# Patient Record
Sex: FEMALE | Race: WHITE | NOT HISPANIC OR LATINO | Employment: FULL TIME | URBAN - METROPOLITAN AREA
[De-identification: names, ages, dates, MRNs, and addresses within clinical notes are randomized per-mention and may not be internally consistent; named-entity substitution may affect disease eponyms.]

---

## 2023-03-29 ENCOUNTER — OFFICE VISIT (OUTPATIENT)
Dept: URGENT CARE | Facility: CLINIC | Age: 23
End: 2023-03-29
Payer: COMMERCIAL

## 2023-03-29 VITALS
DIASTOLIC BLOOD PRESSURE: 88 MMHG | WEIGHT: 230 LBS | RESPIRATION RATE: 20 BRPM | HEART RATE: 90 BPM | BODY MASS INDEX: 34.07 KG/M2 | HEIGHT: 69 IN | SYSTOLIC BLOOD PRESSURE: 127 MMHG | OXYGEN SATURATION: 97 % | TEMPERATURE: 98 F

## 2023-03-29 DIAGNOSIS — N30.01 ACUTE CYSTITIS WITH HEMATURIA: Primary | ICD-10-CM

## 2023-03-29 LAB
BILIRUB UR QL STRIP: NEGATIVE
GLUCOSE UR QL STRIP: NEGATIVE
KETONES UR QL STRIP: NEGATIVE
LEUKOCYTE ESTERASE UR QL STRIP: POSITIVE
PH, POC UA: 7
POC BLOOD, URINE: POSITIVE
POC NITRATES, URINE: NEGATIVE
PROT UR QL STRIP: POSITIVE
SP GR UR STRIP: 1.01 (ref 1–1.03)
UROBILINOGEN UR STRIP-ACNC: 2 (ref 0.1–1.1)

## 2023-03-29 PROCEDURE — 81003 URINALYSIS AUTO W/O SCOPE: CPT | Mod: QW,,, | Performed by: FAMILY MEDICINE

## 2023-03-29 PROCEDURE — 81003 POCT URINALYSIS, DIPSTICK, AUTOMATED, W/O SCOPE: ICD-10-PCS | Mod: QW,,, | Performed by: FAMILY MEDICINE

## 2023-03-29 PROCEDURE — 99213 OFFICE O/P EST LOW 20 MIN: CPT | Mod: S$PBB,,, | Performed by: FAMILY MEDICINE

## 2023-03-29 PROCEDURE — 87077 CULTURE AEROBIC IDENTIFY: CPT | Performed by: FAMILY MEDICINE

## 2023-03-29 PROCEDURE — 99213 PR OFFICE/OUTPT VISIT, EST, LEVL III, 20-29 MIN: ICD-10-PCS | Mod: S$PBB,,, | Performed by: FAMILY MEDICINE

## 2023-03-29 RX ORDER — NITROFURANTOIN 25; 75 MG/1; MG/1
100 CAPSULE ORAL 2 TIMES DAILY
Qty: 10 CAPSULE | Refills: 0 | Status: SHIPPED | OUTPATIENT
Start: 2023-03-29 | End: 2023-04-03

## 2023-03-29 RX ORDER — PHENAZOPYRIDINE HYDROCHLORIDE 200 MG/1
200 TABLET, FILM COATED ORAL 3 TIMES DAILY PRN
Qty: 6 TABLET | Refills: 0 | Status: SHIPPED | OUTPATIENT
Start: 2023-03-29 | End: 2023-03-31

## 2023-03-29 RX ORDER — ALPRAZOLAM 2 MG/1
2 TABLET ORAL ONCE
COMMUNITY
Start: 2023-02-22

## 2023-03-29 NOTE — PROGRESS NOTES
"        Patient ID: [id number]     Chief Complaint: Dysuria (Burning with urination, x 10 days, diarrhea last week, now constipated )      History of Present Illness:     Kamila Moore is a 22 y.o. female with a history of prior UTIs who presents today for symptoms of urinary frequency, urgency and dysuria. She denies experiencing any fevers, chills, nausea, vomiting, back pain/ flank pain, or significant fatigue or malaise beyond baseline. Her oral intake has been normal.     Past Medical History:     ----------------------------  No known health problems     Past Surgical History:   Procedure Laterality Date    NO PAST SURGERIES         Review of patient's allergies indicates:  No Known Allergies    No outpatient medications have been marked as taking for the 3/29/23 encounter (Office Visit) with Keven Chau MD.       Social History     Socioeconomic History    Marital status: Single   Tobacco Use    Smoking status: Every Day     Types: Cigarettes, Vaping with nicotine    Smokeless tobacco: Never   Substance and Sexual Activity    Alcohol use: Yes     Alcohol/week: 4.0 standard drinks     Types: 4 Cans of beer per week    Drug use: Never        Family History   Problem Relation Age of Onset    Mental illness Mother     Diabetes Mother     Hypertension Father     Asthma Sister     No Known Problems Brother         Subjective:     Review of Systems   Constitutional:  Negative for chills, fever and malaise/fatigue.   Gastrointestinal:  Positive for constipation and diarrhea. Negative for abdominal pain, nausea and vomiting.   Genitourinary:  Positive for dysuria, frequency and urgency.   Musculoskeletal:  Negative for back pain.     Objective:     /88   Pulse 90   Temp 98.1 °F (36.7 °C) (Oral)   Resp 20   Ht 5' 9" (1.753 m)   Wt 104.3 kg (230 lb)   LMP 03/16/2023 (Approximate)   SpO2 97%   BMI 33.97 kg/m²     Physical Exam  Constitutional:       General: She is not in acute distress.     " "Appearance: Normal appearance. She is normal weight. She is not ill-appearing or toxic-appearing.   Cardiovascular:      Rate and Rhythm: Normal rate.   Pulmonary:      Effort: Pulmonary effort is normal.   Abdominal:      Tenderness: There is abdominal tenderness. There is no right CVA tenderness or left CVA tenderness.   Skin:     Coloration: Skin is not pale.      Findings: No rash.   Neurological:      Mental Status: She is alert.       Assessment & Plan:       ICD-10-CM ICD-9-CM   1. Acute cystitis with hematuria  N30.01 595.0        1. Acute cystitis with hematuria  -     POCT Urinalysis, Dipstick, Automated, W/O Scope  -     nitrofurantoin, macrocrystal-monohydrate, (MACROBID) 100 MG capsule; Take 1 capsule (100 mg total) by mouth 2 (two) times daily. for 5 days  Dispense: 10 capsule; Refill: 0  -     phenazopyridine (PYRIDIUM) 200 MG tablet; Take 1 tablet (200 mg total) by mouth 3 (three) times daily as needed for Pain.  Dispense: 6 tablet; Refill: 0  -     Urine culture         POC urinalysis results were consistent with a UTI. She does not have symptoms indicating the infection has spread beyond the bladder (e.g., fever >99.9F, chills, flank pain, significant fatigue/malaise, CVA tenderness). She is not pregnant, is not a renal transplant recipient, is not otherwise immunocompromised, has no underlying urological abnormalities (e.g., nephrolithiasis, strictures, stents, or urinary diversions), and does not have poorly controlled DM. Her presentation is therefore consistent with acute simple cystitis.     Her prior 3 month history does not show that there are any risk factors for a Multi-Drug Resistant gram-negative infection: there is no MDR urinary isolate, no inpatient stay at a health care facility, no use of fluoroquinolones, TMP-SMX, or 3rd/4th gen cephalosporins, and she has not traveled to [place], [place], [place], or [place]. Further, this UTI does not constitute a  "Recurrent UTI" (i.e., ?2 infections " in six months or ?3 infections in one year). Absent MDR risk factors or recurrent UTIs, empiric antibiotic therapy can be administered without urine culture.     We discussed that guideline-directed empiric antibiotic treatment begins with Nitrofurantoin (if CrCl >30), TMP-SMX, or Fosfomycin, unless she has an allergy or intolerance to them. With no such allergy, we will start with Macrobid.     We discussed that, if she continues to have symptoms 48-72 hours after starting antibiotics, she should return for a urine culture if not already ordered, broadening of antibiotic coverage, and re-evaluation for alternative diagnoses.    Since hematuria was found on urinalysis today, she was advised to follow up with her primary provider 4 weeks after completing antibiotics for a repeat urinalysis to confirm resolution of the hematuria.

## 2023-03-29 NOTE — PATIENT INSTRUCTIONS
Please take Macrobid twice daily for 5 days.    We will call you with the results of your urine culture in a couple of days.    Because blood was found in your urine, we recommend you have a repeat urine dipstick or urinalysis done 4 weeks after your urinary symptoms resolve, to make sure that the blood is gone from your urine.    If you develop fever, chills, nausea, vomiting, flank pain, back pain, abdominal pain, or visible blood in the urine, please present to the emergency room or otherwise seek medical attention.

## 2023-04-01 LAB — BACTERIA UR CULT: ABNORMAL

## 2023-05-29 ENCOUNTER — OFFICE VISIT (OUTPATIENT)
Dept: URGENT CARE | Facility: CLINIC | Age: 23
End: 2023-05-29
Payer: COMMERCIAL

## 2023-05-29 VITALS
WEIGHT: 229.94 LBS | HEIGHT: 69 IN | OXYGEN SATURATION: 98 % | SYSTOLIC BLOOD PRESSURE: 117 MMHG | TEMPERATURE: 99 F | DIASTOLIC BLOOD PRESSURE: 85 MMHG | RESPIRATION RATE: 18 BRPM | BODY MASS INDEX: 34.06 KG/M2 | HEART RATE: 91 BPM

## 2023-05-29 DIAGNOSIS — J02.9 SORE THROAT: Primary | ICD-10-CM

## 2023-05-29 DIAGNOSIS — R52 BODY ACHES: ICD-10-CM

## 2023-05-29 DIAGNOSIS — J02.9 PHARYNGITIS, UNSPECIFIED ETIOLOGY: ICD-10-CM

## 2023-05-29 LAB
CTP QC/QA: YES
MOLECULAR STREP A: NEGATIVE
POC MOLECULAR INFLUENZA A AGN: NEGATIVE
POC MOLECULAR INFLUENZA B AGN: NEGATIVE
SARS-COV-2 RDRP RESP QL NAA+PROBE: NEGATIVE

## 2023-05-29 PROCEDURE — 99204 OFFICE O/P NEW MOD 45 MIN: CPT | Mod: 25,,, | Performed by: NURSE PRACTITIONER

## 2023-05-29 PROCEDURE — 96372 THER/PROPH/DIAG INJ SC/IM: CPT | Mod: ,,, | Performed by: NURSE PRACTITIONER

## 2023-05-29 PROCEDURE — 99204 PR OFFICE/OUTPT VISIT, NEW, LEVL IV, 45-59 MIN: ICD-10-PCS | Mod: 25,,, | Performed by: NURSE PRACTITIONER

## 2023-05-29 PROCEDURE — 96372 PR INJECTION,THERAP/PROPH/DIAG2ST, IM OR SUBCUT: ICD-10-PCS | Mod: ,,, | Performed by: NURSE PRACTITIONER

## 2023-05-29 PROCEDURE — 87635: ICD-10-PCS | Mod: QW,,, | Performed by: NURSE PRACTITIONER

## 2023-05-29 PROCEDURE — 87502 INFLUENZA DNA AMP PROBE: CPT | Mod: QW,,, | Performed by: NURSE PRACTITIONER

## 2023-05-29 PROCEDURE — 87502 POCT INFLUENZA A/B MOLECULAR: ICD-10-PCS | Mod: QW,,, | Performed by: NURSE PRACTITIONER

## 2023-05-29 PROCEDURE — 87635 SARS-COV-2 COVID-19 AMP PRB: CPT | Mod: QW,,, | Performed by: NURSE PRACTITIONER

## 2023-05-29 PROCEDURE — 87651 STREP A DNA AMP PROBE: CPT | Mod: QW,,, | Performed by: NURSE PRACTITIONER

## 2023-05-29 PROCEDURE — 87651 POCT STREP A MOLECULAR: ICD-10-PCS | Mod: QW,,, | Performed by: NURSE PRACTITIONER

## 2023-05-29 RX ORDER — AZITHROMYCIN 250 MG/1
TABLET, FILM COATED ORAL
Qty: 6 TABLET | Refills: 0 | Status: SHIPPED | OUTPATIENT
Start: 2023-05-29

## 2023-05-29 RX ORDER — DEXAMETHASONE SODIUM PHOSPHATE 100 MG/10ML
10 INJECTION INTRAMUSCULAR; INTRAVENOUS ONCE
Status: COMPLETED | OUTPATIENT
Start: 2023-05-29 | End: 2023-05-29

## 2023-05-29 RX ADMIN — DEXAMETHASONE SODIUM PHOSPHATE 10 MG: 100 INJECTION INTRAMUSCULAR; INTRAVENOUS at 01:05

## 2023-05-29 NOTE — PATIENT INSTRUCTIONS
Increase oral fluids  Warm salt water gargles as instructed  OTC Chloraseptic spray as directed  Ibuprofen or Tylenol OTC for pain as directed  Take prescription medication as directed  Change toothbrush  Follow up PCP or return here for concerns

## 2023-05-29 NOTE — PROGRESS NOTES
"Subjective:      Patient ID: Kamila Moore is a 23 y.o. female.    Vitals:  height is 5' 9" (1.753 m) and weight is 104.3 kg (229 lb 15 oz). Her oral temperature is 98.9 °F (37.2 °C). Her blood pressure is 117/85 and her pulse is 91. Her respiration is 18 and oxygen saturation is 98%.     Chief Complaint: Sore Throat (Pt c/o sore throat onset Saturday with headaches onset Friday. )    23-year-old female presents with sinus congestion and worsening sore throat.  Onset 3 days ago.  No shortness of breath or fever    HENT:  Positive for congestion and sore throat.     Objective:     Physical Exam   Constitutional: She is oriented to person, place, and time. She appears well-developed. She is cooperative.  Non-toxic appearance. She does not appear ill. No distress.   HENT:   Head: Normocephalic and atraumatic.   Ears:   Right Ear: Hearing, tympanic membrane, external ear and ear canal normal.   Left Ear: Hearing, tympanic membrane, external ear and ear canal normal.   Nose: Nose normal. No mucosal edema, rhinorrhea or nasal deformity. No epistaxis. Right sinus exhibits no maxillary sinus tenderness and no frontal sinus tenderness. Left sinus exhibits no maxillary sinus tenderness and no frontal sinus tenderness.   Mouth/Throat: Uvula is midline and mucous membranes are normal. No trismus in the jaw. Normal dentition. No uvula swelling. Oropharyngeal exudate and posterior oropharyngeal erythema present. No posterior oropharyngeal edema.   Eyes: Conjunctivae and lids are normal. No scleral icterus.   Neck: Trachea normal and phonation normal. Neck supple. No edema present. No erythema present. No neck rigidity present.   Cardiovascular: Normal rate, regular rhythm, normal heart sounds and normal pulses.   Pulmonary/Chest: Effort normal and breath sounds normal. No respiratory distress. She has no decreased breath sounds. She has no rhonchi.   Abdominal: Normal appearance.   Musculoskeletal: Normal range of motion.   "       General: No deformity. Normal range of motion.   Neurological: She is alert and oriented to person, place, and time. She exhibits normal muscle tone. Coordination normal.   Skin: Skin is warm, dry, intact, not diaphoretic and not pale.   Psychiatric: Her speech is normal and behavior is normal. Judgment and thought content normal.   Nursing note and vitals reviewed.    Assessment:     1. Sore throat    2. Body aches    3. Pharyngitis, unspecified etiology      Office Visit on 05/29/2023   Component Date Value Ref Range Status    Molecular Strep A, POC 05/29/2023 Negative  Negative Final     Acceptable 05/29/2023 Yes   Final    POC Rapid COVID 05/29/2023 Negative  Negative Final     Acceptable 05/29/2023 Yes   Final    POC Molecular Influenza A Ag 05/29/2023 Negative  Negative, Not Reported Final    POC Molecular Influenza B Ag 05/29/2023 Negative  Negative, Not Reported Final     Acceptable 05/29/2023 Yes   Final      Plan:   Increase oral fluids  Warm salt water gargles as instructed  OTC Chloraseptic spray as directed  Ibuprofen or Tylenol OTC for pain as directed  Take prescription medication as directed  Change toothbrush  Follow up PCP or return here for concerns   Antibiotics will be prescribed despite a negative strep test due to clinical assessment and findings  Sore throat  -     POCT Strep A, Molecular    Body aches  -     POCT COVID-19 Rapid Screening  -     POCT Influenza A/B MOLECULAR    Pharyngitis, unspecified etiology

## 2023-07-17 ENCOUNTER — OFFICE VISIT (OUTPATIENT)
Dept: URGENT CARE | Facility: CLINIC | Age: 23
End: 2023-07-17
Payer: COMMERCIAL

## 2023-07-17 VITALS
RESPIRATION RATE: 18 BRPM | HEART RATE: 91 BPM | DIASTOLIC BLOOD PRESSURE: 90 MMHG | SYSTOLIC BLOOD PRESSURE: 124 MMHG | BODY MASS INDEX: 33.92 KG/M2 | OXYGEN SATURATION: 99 % | HEIGHT: 69 IN | WEIGHT: 229 LBS | TEMPERATURE: 98 F

## 2023-07-17 DIAGNOSIS — R00.2 PALPITATIONS WITH REGULAR CARDIAC RHYTHM: Primary | ICD-10-CM

## 2023-07-17 DIAGNOSIS — F41.9 ANXIETY: ICD-10-CM

## 2023-07-17 PROCEDURE — 99213 OFFICE O/P EST LOW 20 MIN: CPT | Mod: ,,, | Performed by: FAMILY MEDICINE

## 2023-07-17 PROCEDURE — 99213 PR OFFICE/OUTPT VISIT, EST, LEVL III, 20-29 MIN: ICD-10-PCS | Mod: ,,, | Performed by: FAMILY MEDICINE

## 2023-07-17 NOTE — LETTER
July 17, 2023      [facility]  [address], [address]  [place] [address]  Phone: [phone]  Fax: [phone]       Patient: Kamila Moore   YOB: 2000  Date of Visit: 07/17/2023    To Whom It May Concern:    Angela Moore  was at [facility] on 07/17/2023. The patient may return to work/school on 07/18/2023 with no restrictions. If you have any questions or concerns, or if I can be of further assistance, please do not hesitate to contact me.    Sincerely,    Keven Chau MD

## 2023-07-17 NOTE — PATIENT INSTRUCTIONS
Please hydrate with electrolyte rich fluids.    Please return if your heart palpitations continue after your diarrhea and abdominal pain fully resolve.    Please also return if you develop shortness of breath at rest, chest pain, dizziness, or began feeling an erratic heart rate.

## 2023-07-17 NOTE — PROGRESS NOTES
Patient ID: [id number]     Chief Complaint:  Abdominal pain    History of Present Illness:     Kamila Moore is a 23 y.o. female  who presents today for symptoms of Abdominal Pain (Patient presents to the clinic c/o LLQ Abdominal pain, nausea, diarrhea x 3 days. rapid heart rate (highest of 120bpm on Sunday). Patient reports chest tightness which started last night, but subsided. Denies vomit; OTC meds taken: tylenol )        23-year-old female with a history of anxiety presents today with a 3 day history of mild nausea, diarrhea, and recently a rapid heart rate detected on her watch, and palpitations which she can feel.  The rapid heart rate and palpitations are intermittent.  They woke her up last night a couple of times, but were gone when she woke up for good this morning.  She is never had panic attacks before, but she believes that she is concerned about her heart rate which might be making it worse.  She reports she might be slightly dehydrated because she has had so much diarrhea with decreased oral intake.  Her diarrhea is mostly gone, and her abdominal pain is gone this morning.  Her main concern coming in is she wanted to talk about her heart rate because it is worrisome to her.  No history of personal or family heart history.  She denies chest pain, shortness at breath, or dizziness, but she had some fleeting chest tightness last night that resolved on its own.    Pt denies experiencing any fevers, chills, vomiting, difficulty breathing, dysphagia, or neck stiffness.    Past Medical History:     ----------------------------  No known health problems     Past Surgical History:   Procedure Laterality Date    NO PAST SURGERIES         Review of patient's allergies indicates:  No Known Allergies    No outpatient medications have been marked as taking for the 7/17/23 encounter (Office Visit) with Keven Chau MD.       Social History     Socioeconomic History    Marital status: Single   Tobacco Use  "   Smoking status: Every Day     Types: Vaping with nicotine    Smokeless tobacco: Never   Substance and Sexual Activity    Alcohol use: Yes     Alcohol/week: 4.0 standard drinks     Types: 4 Cans of beer per week    Drug use: Never        Family History   Problem Relation Age of Onset    Mental illness Mother     Diabetes Mother     Hypertension Father     Asthma Sister     No Known Problems Brother         Subjective:     Review of Systems   Constitutional:  Negative for chills, fever and malaise/fatigue.   HENT:  Negative for congestion, ear discharge, ear pain, sinus pain and sore throat.    Respiratory:  Negative for cough, sputum production, shortness of breath, wheezing and stridor.    Gastrointestinal:  Positive for abdominal pain, diarrhea and nausea. Negative for blood in stool, constipation, heartburn, melena and vomiting.   Genitourinary:  Negative for dysuria, frequency and urgency.   Musculoskeletal:  Negative for neck pain.   Skin:  Negative for rash.   Neurological:  Negative for headaches.     Objective:     BP (!) 124/90   Pulse 91   Temp 98.2 °F (36.8 °C)   Resp 18   Ht 5' 9" (1.753 m)   Wt 103.9 kg (229 lb)   LMP 07/07/2023   SpO2 99%   BMI 33.82 kg/m²     Physical Exam  Constitutional:       General: She is not in acute distress.     Appearance: Normal appearance. She is not ill-appearing or toxic-appearing.   HENT:      Head: Normocephalic and atraumatic.      Right Ear: Tympanic membrane and ear canal normal.      Left Ear: Tympanic membrane and ear canal normal.      Nose: No congestion or rhinorrhea.      Mouth/Throat:      Pharynx: Oropharynx is clear. No oropharyngeal exudate or posterior oropharyngeal erythema.   Eyes:      General:         Right eye: No discharge.         Left eye: No discharge.      Extraocular Movements: Extraocular movements intact.      Conjunctiva/sclera: Conjunctivae normal.   Cardiovascular:      Rate and Rhythm: Normal rate and regular rhythm.      " Pulses: Normal pulses.      Heart sounds: Normal heart sounds. No murmur heard.    No gallop.      Comments: Heart rate 85 on exam.  Normal rhythm.  Bilateral radial pulses are strong and coincident.  No abnormal heart sounds  Pulmonary:      Effort: Pulmonary effort is normal. No respiratory distress.      Breath sounds: Normal breath sounds. No stridor. No wheezing, rhonchi or rales.   Chest:      Chest wall: No tenderness.   Abdominal:      General: There is no distension.      Palpations: There is no mass.      Tenderness: There is no abdominal tenderness. There is no right CVA tenderness, left CVA tenderness, guarding or rebound.      Hernia: No hernia is present.   Musculoskeletal:      Cervical back: No rigidity or tenderness.   Lymphadenopathy:      Cervical: No cervical adenopathy.   Neurological:      Mental Status: She is alert and oriented to person, place, and time. Mental status is at baseline.   Psychiatric:         Mood and Affect: Mood normal.         Behavior: Behavior normal.       Assessment & Plan:       ICD-10-CM ICD-9-CM   1. Palpitations with regular cardiac rhythm  R00.2 785.1   2. Anxiety  F41.9 300.00        1. Palpitations with regular cardiac rhythm    2. Anxiety       Declines EKG today.  Declines treatment for abdominal symptoms.  She really just wanted to talk to her provider about the heart issues for reassurance.  We discussed that she has a good reason to have occasional fluctuations in heart rate given that she is not felt good all weekend with diarrhea and abdominal pain.  She will hydrate with plenty of fluids and call back if any of her her symptoms are still present once her GI symptoms resolve.    .

## 2024-01-24 ENCOUNTER — OFFICE VISIT (OUTPATIENT)
Dept: URGENT CARE | Facility: CLINIC | Age: 24
End: 2024-01-24
Payer: COMMERCIAL

## 2024-01-24 VITALS
OXYGEN SATURATION: 98 % | WEIGHT: 229 LBS | TEMPERATURE: 98 F | HEIGHT: 69 IN | HEART RATE: 99 BPM | DIASTOLIC BLOOD PRESSURE: 73 MMHG | BODY MASS INDEX: 33.92 KG/M2 | RESPIRATION RATE: 17 BRPM | SYSTOLIC BLOOD PRESSURE: 125 MMHG

## 2024-01-24 DIAGNOSIS — B97.89 SORE THROAT DUE TO VIRUS: ICD-10-CM

## 2024-01-24 DIAGNOSIS — U07.1 COVID-19: Primary | ICD-10-CM

## 2024-01-24 DIAGNOSIS — R09.82 PND (POST-NASAL DRIP): ICD-10-CM

## 2024-01-24 DIAGNOSIS — R05.9 COUGH IN ADULT: ICD-10-CM

## 2024-01-24 DIAGNOSIS — J02.8 SORE THROAT DUE TO VIRUS: ICD-10-CM

## 2024-01-24 DIAGNOSIS — R52 BODY ACHES: ICD-10-CM

## 2024-01-24 DIAGNOSIS — R09.81 NASAL CONGESTION: ICD-10-CM

## 2024-01-24 LAB
CTP QC/QA: YES
SARS-COV-2 AG RESP QL IA.RAPID: POSITIVE

## 2024-01-24 PROCEDURE — 99202 OFFICE O/P NEW SF 15 MIN: CPT | Mod: S$GLB,,, | Performed by: NURSE PRACTITIONER

## 2024-01-24 PROCEDURE — 87811 SARS-COV-2 COVID19 W/OPTIC: CPT | Mod: QW,S$GLB,, | Performed by: NURSE PRACTITIONER

## 2024-01-24 NOTE — PATIENT INSTRUCTIONS
Isolation for 5 days and an additional 5 days of wearing an N95 mask around others.  Increase fluids.  Get plenty of rest.   Normal saline nasal wash to irrigate sinuses and for congestion/runny nose.  Cool mist humidifier/vaporizer.  Practice good handwashing.  Mucinex for cough and chest congestion.  Tylenol or Ibuprofen for fever, headache and body aches.  Warm salt water gargles for throat comfort.  Chloraseptic spray or lozenges for throat comfort.  See PCP or go to ER if symptoms worsen or fail to improve with treatment.

## 2024-01-24 NOTE — LETTER
January 24, 2024      [facility]  [address] [place] [address] [address]  [facility] [address]  Phone: [phone]       Patient: Kamila Moore   YOB: 2000  Date of Visit: 01/24/2024    To Whom It May Concern:    Angela Moore  was at [facility] on 01/24/2024. The patient may return to work/school on 01/29/2024 if fever free for 24 hours with no restrictions. If you have any questions or concerns, or if I can be of further assistance, please do not hesitate to contact me.    Sincerely,          Mariam Garcia NP

## 2024-01-24 NOTE — PROGRESS NOTES
"Subjective:      Patient ID: Kamila Moore is a 23 y.o. female.    Vitals:  height is 5' 9" (1.753 m) and weight is 103.9 kg (229 lb). Her tympanic temperature is 97.7 °F (36.5 °C). Her blood pressure is 125/73 and her pulse is 99. Her respiration is 17 and oxygen saturation is 98%.     Chief Complaint: Cough    Presents with cough, fatigue, nasal congestion, post nasal drip and body aches. Symptoms started on 01/22/24. Has taken tylenol. No relief.   Patient states that she has "brain fog". States she has been losing her train of thought since 01/22/24.     Cough  This is a new problem. The current episode started in the past 7 days. The problem has been unchanged. The cough is Non-productive. Associated symptoms include nasal congestion and postnasal drip. Nothing aggravates the symptoms. Treatments tried: tylenol.       HENT:  Positive for postnasal drip.    Respiratory:  Positive for cough.       Objective:     Physical Exam   Constitutional: She is oriented to person, place, and time. She appears well-developed. She is cooperative.  Non-toxic appearance. She does not appear ill. No distress.   HENT:   Head: Normocephalic and atraumatic.   Ears:   Right Ear: Hearing, tympanic membrane, external ear and ear canal normal.   Left Ear: Hearing, tympanic membrane, external ear and ear canal normal.   Nose: Mucosal edema, rhinorrhea, purulent discharge and congestion present. No nasal deformity. No epistaxis. Right sinus exhibits frontal sinus tenderness. Right sinus exhibits no maxillary sinus tenderness. Left sinus exhibits frontal sinus tenderness. Left sinus exhibits no maxillary sinus tenderness.   Mouth/Throat: Uvula is midline and mucous membranes are normal. Mucous membranes are moist. No trismus in the jaw. Normal dentition. No uvula swelling. Posterior oropharyngeal edema and posterior oropharyngeal erythema present. No oropharyngeal exudate.   Eyes: Conjunctivae and lids are normal. No scleral " icterus.   Neck: Trachea normal and phonation normal. Neck supple. No edema present. No erythema present. No neck rigidity present.   Cardiovascular: Normal rate, regular rhythm, normal heart sounds and normal pulses.   Pulmonary/Chest: Effort normal and breath sounds normal. No respiratory distress. She has no decreased breath sounds. She has no rhonchi.   Abdominal: Normal appearance.   Musculoskeletal: Normal range of motion.         General: No deformity. Normal range of motion.   Neurological: She is alert and oriented to person, place, and time. She exhibits normal muscle tone. Coordination normal.   Skin: Skin is warm, dry, intact, not diaphoretic and not pale.   Psychiatric: Her speech is normal and behavior is normal. Judgment and thought content normal.   Nursing note and vitals reviewed.      Assessment:     1. COVID-19    2. Nasal congestion    3. Cough in adult    4. PND (post-nasal drip)    5. Body aches    6. Sore throat due to virus        Plan:       COVID-19    Nasal congestion  -     SARS Coronavirus 2 Antigen, POCT Manual Read    Cough in adult    PND (post-nasal drip)    Body aches    Sore throat due to virus          Isolation for 5 days and an additional 5 days of wearing an N95 mask around others.  Increase fluids.  Get plenty of rest.   Normal saline nasal wash to irrigate sinuses and for congestion/runny nose.  Cool mist humidifier/vaporizer.  Practice good handwashing.  Mucinex for cough and chest congestion.  Tylenol or Ibuprofen for fever, headache and body aches.  Warm salt water gargles for throat comfort.  Chloraseptic spray or lozenges for throat comfort.  See PCP or go to ER if symptoms worsen or fail to improve with treatment.